# Patient Record
(demographics unavailable — no encounter records)

---

## 2024-11-20 NOTE — HISTORY OF PRESENT ILLNESS
[de-identified] : 70-year-old comes in today because of a bump on the back of her lower leg that she noticed 1 week ago.  There was no injury or trauma.  Initially it felt like a shelf and then more like a little nodule.  It is not tender.  She has no pain walking.  She has not seen anyone for it.  No prior issues. It is not painful but she was concerned and wanted to get it checked out

## 2024-11-20 NOTE — ASSESSMENT
[FreeTextEntry1] : 70-year-old woman comes in with a small mass on the posterior left distal lower leg just above the ankle overlying the Achilles tendon.  This may be a peritendinitis although that is usually much more tender and painful.  Perhaps there is just an irritation or it is in the soft tissues. It does not definitely involve the tendon like Achilles tendinopathy. I recommended that she apply Voltaren gel and rest and avoid any irritation to the area and follow-up in about 3 weeks to see if it is going away.  If there is concern we can always get imaging with an ultrasound or MRI.

## 2024-11-20 NOTE — PHYSICAL EXAM
[LE] : Sensory: Intact in bilateral lower extremities [Normal RLE] : Right Lower Extremity: No scars, rashes, lesions, ulcers, skin intact [Normal LLE] : Left Lower Extremity: No scars, rashes, lesions, ulcers, skin intact [Normal Touch] : sensation intact for touch [Normal] : Gait: normal [de-identified] : LEFT lower leg and ankle Normal gait.  She can walk on her heels and toes without pain or difficulty. No edema, ecchymoses, erythema or deformity. The posterior left leg over the posterior Achilles approximately 3 to 4 cm above the insertion there is a small visible and palpable bump that is nontender.  It feels like it may be superficial to the tendon involving the peritenon as opposed to tendinopathy of the tendon itself. Intact ankle and subtalar motion. 5/5 anterior tibial tendon, gastrocsoleus, peroneals, posterior tibial tendon, EHL. Sensation is intact. Normal Nicholas test

## 2024-12-13 NOTE — PHYSICAL EXAM
[LE] : Sensory: Intact in bilateral lower extremities [Normal RLE] : Right Lower Extremity: No scars, rashes, lesions, ulcers, skin intact [Normal LLE] : Left Lower Extremity: No scars, rashes, lesions, ulcers, skin intact [Normal Touch] : sensation intact for touch [Normal] : Oriented to person, place, and time, insight and judgement were intact and the affect was normal [de-identified] : LEFT lower leg and ankle Normal gait.  She can walk on her heels and toes without pain or difficulty. No edema, ecchymoses, erythema or deformity. The posterior left leg over the posterior Achilles approximately 3 to 4 cm above the insertion there is a small, few millimeter visible and palpable bump that is nontender, not mobile and somewhat soft.  It feels like it may be superficial to the tendon involving the peritenon as opposed to tendinopathy of the tendon itself. Intact ankle and subtalar motion. 5/5 anterior tibial tendon, gastrocsoleus, peroneals, posterior tibial tendon, EHL. Sensation is intact. Normal Nicholas test

## 2024-12-13 NOTE — HISTORY OF PRESENT ILLNESS
[de-identified] : 70-year-old comes in for f/u for a bump on the back of her lower leg that she noticed 1mo ago.  She states that it is a little smaller.  It certainly has not gotten bigger and is not causing any pain or issues walking around.  She applied some Voltaren gel.

## 2024-12-13 NOTE — ASSESSMENT
[FreeTextEntry1] : 70-year-old woman comes in with a small mass on the posterior left distal lower leg just above the ankle overlying the Achilles tendon.  I think she may have some peritendinitis or thickening of the peritenon as opposed to tendinopathy but either is possible.  I do not think this is a tumor which was her main concern when she came in because it really was not hurting.  It seems a little smaller.  If it is not gone in the next 4 weeks I would like for her to go for a diagnostic ultrasound to take a look before she comes back in for follow-up in 5 to 6 weeks.  She is planning on skiing which I do not think should be an issue being in a boot since it does not involve any pushoff.

## 2024-12-13 NOTE — PHYSICAL EXAM
[LE] : Sensory: Intact in bilateral lower extremities [Normal RLE] : Right Lower Extremity: No scars, rashes, lesions, ulcers, skin intact [Normal LLE] : Left Lower Extremity: No scars, rashes, lesions, ulcers, skin intact [Normal Touch] : sensation intact for touch [Normal] : Oriented to person, place, and time, insight and judgement were intact and the affect was normal [de-identified] : LEFT lower leg and ankle Normal gait.  She can walk on her heels and toes without pain or difficulty. No edema, ecchymoses, erythema or deformity. The posterior left leg over the posterior Achilles approximately 3 to 4 cm above the insertion there is a small, few millimeter visible and palpable bump that is nontender, not mobile and somewhat soft.  It feels like it may be superficial to the tendon involving the peritenon as opposed to tendinopathy of the tendon itself. Intact ankle and subtalar motion. 5/5 anterior tibial tendon, gastrocsoleus, peroneals, posterior tibial tendon, EHL. Sensation is intact. Normal Nicholas test

## 2024-12-13 NOTE — HISTORY OF PRESENT ILLNESS
[de-identified] : 70-year-old comes in for f/u for a bump on the back of her lower leg that she noticed 1mo ago.  She states that it is a little smaller.  It certainly has not gotten bigger and is not causing any pain or issues walking around.  She applied some Voltaren gel.

## 2025-03-07 NOTE — REASON FOR VISIT
[FreeTextEntry1] : ======================================================================= Diagnostic Tests: -------------------------------------------------------------- ECG:

## 2025-03-07 NOTE — ASSESSMENT
[FreeTextEntry1] : =======================================================================================

## 2025-07-21 NOTE — PHYSICAL EXAM
[Normal Sclera/Conjunctiva] : normal sclera/conjunctiva [EOMI] : extraocular movements intact [Supple] : supple [Normal Gait] : normal gait [de-identified] : PLEASE SEE HPI FOR ADDITIONAL RELEVANT PHYSICAL EXAM FINDINGS GENERAL: no acute distress, nontoxic HEAD: normocephalic EYES: no scleral icterus NECK: trachea midline, Full ROM/supple RESP: no respiratory distress, no obvious wheeze or stridor, no accessory muscle use noted ABD: nondistended MSK: no gross deformity NEURO: alert & fully oriented SKIN: no rash PSYCH: cooperative, good insight, appropriate, fluent speech  [de-identified] : no bruising to forehead [de-identified] : grossly normal b/l UE motor strength, f-->n intact b/l

## 2025-07-21 NOTE — PLAN
[FreeTextEntry1] : D/w pt at length re risk profile for ICH; she understands low risk is not the same as no risk, comfortable monitoring symptoms at home.   Start APAP 500mg QID prn HA Very strict ED precautions discussed at length re s/s that would necessitate ED eval Presuming no worsening symptoms, OK to fly tomorrow and take ASA 81mg x 1.

## 2025-07-21 NOTE — HISTORY OF PRESENT ILLNESS
[Home] : at home, [unfilled] , at the time of the visit. [Other Location: e.g. Home (Enter Location, City,State)___] : at [unfilled] [Telehealth (audio & video)] : This visit was provided via telehealth using real-time 2-way audio visual technology. [Verbal consent obtained from patient] : the patient, [unfilled] [FreeTextEntry8] : 70 y F c closed head injury (bending over, struck head on glass table), front of head, no loc, no n/v, no weakness/lethargy afterwards.  Complaining now of mild HA and some brain fog; she is still able to work Per pt's work associate, no changes to mentation, mental slowing, confusion, etc Not on AC.  No falls or other trauma associated.  No paresthesias to scalp or extremities.  No dizziness or gait imbalance. No bruising or hematoma No known concussion hx PMH denies